# Patient Record
Sex: MALE | Race: WHITE | Employment: OTHER | URBAN - METROPOLITAN AREA
[De-identification: names, ages, dates, MRNs, and addresses within clinical notes are randomized per-mention and may not be internally consistent; named-entity substitution may affect disease eponyms.]

---

## 2023-05-05 ENCOUNTER — OFFICE VISIT (OUTPATIENT)
Dept: WOUND CARE | Facility: HOSPITAL | Age: 33
End: 2023-05-05
Attending: FAMILY MEDICINE
Payer: MEDICAID

## 2023-05-05 VITALS
DIASTOLIC BLOOD PRESSURE: 87 MMHG | SYSTOLIC BLOOD PRESSURE: 161 MMHG | HEART RATE: 92 BPM | TEMPERATURE: 98 F | OXYGEN SATURATION: 97 % | RESPIRATION RATE: 20 BRPM

## 2023-05-05 DIAGNOSIS — S81.801A OPEN WOUND OF RIGHT LOWER LEG, INITIAL ENCOUNTER: Primary | ICD-10-CM

## 2023-05-05 PROCEDURE — 99214 OFFICE O/P EST MOD 30 MIN: CPT

## 2023-05-05 NOTE — PATIENT INSTRUCTIONS
PATIENT INSTRUCTIONS      FOR Stefani Riedel , DOB 3/7/1990    DATE OF SERVICE: 2023      Apply Medihoney gel to the wound base to both of the wounds on the leg and ankle on the right side. Cover with gauze or border gauze or large Band-Aid. Change on a daily basis. You may shower and clean the wounds with soap and water. Use saline wound cleanser after every shower to clean the wounds. Always keep dressing on the wounds except when showering and changing the dressing.           Follow up with Dr. Mikhail Marshall 2 WEEKS eyeglasses

## 2023-05-05 NOTE — PROGRESS NOTES
Weekly Wound Education Note    Teaching Provided To: Patient  Training Topics: Dressing  Training Method: Demonstration;Explain/Verbal  Training Response: Patient responds and understands        Notes: initiated honey gel and gauze roll with spandagrip

## 2023-05-19 ENCOUNTER — OFFICE VISIT (OUTPATIENT)
Dept: WOUND CARE | Facility: HOSPITAL | Age: 33
End: 2023-05-19
Attending: FAMILY MEDICINE
Payer: MEDICAID

## 2023-05-19 VITALS
RESPIRATION RATE: 18 BRPM | SYSTOLIC BLOOD PRESSURE: 139 MMHG | HEART RATE: 88 BPM | TEMPERATURE: 98 F | DIASTOLIC BLOOD PRESSURE: 83 MMHG | OXYGEN SATURATION: 96 %

## 2023-05-19 DIAGNOSIS — S81.801D OPEN WOUND OF RIGHT LOWER LEG, SUBSEQUENT ENCOUNTER: Primary | ICD-10-CM

## 2023-05-19 PROCEDURE — 99215 OFFICE O/P EST HI 40 MIN: CPT | Performed by: FAMILY MEDICINE

## 2023-05-19 NOTE — PATIENT INSTRUCTIONS
PATIENT INSTRUCTIONS      FOR Lars JuankaitLINN thomas 3/7/1990    DATE OF SERVICE: 2023      Apply Medihoney gel to the wound base to both of the wounds on the leg and ankle on the right side. Cover with gauze or border gauze or large Band-Aid. Change on a daily basis. You may shower and clean the wounds with soap and water. Use saline wound cleanser after every shower to clean the wounds. Always keep dressing on the wounds except when showering and changing the dressing.           Follow up with Dr. Sterling Few 2 WEEKS

## 2023-06-02 ENCOUNTER — OFFICE VISIT (OUTPATIENT)
Dept: WOUND CARE | Facility: HOSPITAL | Age: 33
End: 2023-06-02
Attending: FAMILY MEDICINE
Payer: MEDICAID

## 2023-06-02 VITALS
HEART RATE: 89 BPM | SYSTOLIC BLOOD PRESSURE: 154 MMHG | TEMPERATURE: 98 F | DIASTOLIC BLOOD PRESSURE: 85 MMHG | OXYGEN SATURATION: 95 %

## 2023-06-02 DIAGNOSIS — S81.801D OPEN WOUND OF RIGHT LOWER LEG, SUBSEQUENT ENCOUNTER: Primary | ICD-10-CM

## 2023-06-02 PROCEDURE — 99215 OFFICE O/P EST HI 40 MIN: CPT | Performed by: FAMILY MEDICINE

## 2023-06-02 NOTE — PROGRESS NOTES
Weekly Wound Education Note    Teaching Provided To: Patient  Training Topics: Dressing;Cleasing and general instructions  Training Method: Demonstration;Explain/Verbal  Training Response: Patient responds and understands        Notes: cont.  honey gel and gauze roll with spandagrip

## 2023-06-02 NOTE — PATIENT INSTRUCTIONS
PATIENT INSTRUCTIONS      FOR LINN Gonzalez 3/7/1990    DATE OF SERVICE: 2023      Apply Medihoney gel to the wound base to both of the wounds on the leg and ankle on the right side. Cover with gauze or border gauze or large Band-Aid. Change twice a day. You may shower and clean the wounds with soap and water. Use saline wound cleanser after every shower to clean the wounds. Always keep dressing on the wounds except when showering and changing the dressing.           Follow up with Dr. Sundeep Palmer 2 WEEKS

## 2023-06-23 ENCOUNTER — APPOINTMENT (OUTPATIENT)
Dept: WOUND CARE | Facility: HOSPITAL | Age: 33
End: 2023-06-23
Attending: FAMILY MEDICINE
Payer: MEDICAID

## 2023-06-23 VITALS
HEART RATE: 93 BPM | RESPIRATION RATE: 18 BRPM | OXYGEN SATURATION: 97 % | TEMPERATURE: 97 F | DIASTOLIC BLOOD PRESSURE: 79 MMHG | SYSTOLIC BLOOD PRESSURE: 156 MMHG

## 2023-06-23 DIAGNOSIS — S81.801D OPEN WOUND OF RIGHT LOWER LEG, SUBSEQUENT ENCOUNTER: Primary | ICD-10-CM

## 2023-06-23 PROCEDURE — 99214 OFFICE O/P EST MOD 30 MIN: CPT | Performed by: FAMILY MEDICINE

## 2023-06-23 NOTE — PATIENT INSTRUCTIONS
PATIENT INSTRUCTIONS      FOR LINN Casiano 3/7/1990    DATE OF SERVICE: 2023      Apply Medihoney gel to the wound base to both of the wounds on the leg and ankle on the right side. Cover with gauze or border gauze or large Band-Aid. Change twice a day. You may shower and clean the wounds with soap and water. Use saline wound cleanser after every shower to clean the wounds. Always keep dressing on the wounds except when showering and changing the dressing.           Follow up with Dr. Payam Andrew 2 WEEKS

## 2023-07-07 ENCOUNTER — OFFICE VISIT (OUTPATIENT)
Dept: WOUND CARE | Facility: HOSPITAL | Age: 33
End: 2023-07-07
Attending: FAMILY MEDICINE
Payer: MEDICAID

## 2023-07-07 VITALS
SYSTOLIC BLOOD PRESSURE: 168 MMHG | DIASTOLIC BLOOD PRESSURE: 86 MMHG | OXYGEN SATURATION: 91 % | TEMPERATURE: 98 F | HEART RATE: 92 BPM | RESPIRATION RATE: 20 BRPM

## 2023-07-07 DIAGNOSIS — S81.801D OPEN WOUND OF RIGHT LOWER LEG, SUBSEQUENT ENCOUNTER: Primary | ICD-10-CM

## 2023-07-07 PROCEDURE — 99214 OFFICE O/P EST MOD 30 MIN: CPT | Performed by: FAMILY MEDICINE

## 2023-07-07 NOTE — PATIENT INSTRUCTIONS
PATIENT INSTRUCTIONS      FOR Walker LINN Jean Baptiste 3/7/1990    DATE OF SERVICE: 2023      Apply Medihoney gel to the wound base to both of the wounds on the leg and ankle on the right side. Cover with gauze or border gauze or large Band-Aid. Change twice a day. You may shower and clean the wounds with soap and water. Use saline wound cleanser after every shower to clean the wounds. Always keep dressing on the wounds except when showering and changing the dressing.           Follow up with Dr. Corrinne Lints 2 WEEKS

## 2023-07-07 NOTE — PROGRESS NOTES
Weekly Wound Education Note    Teaching Provided To: Patient  Training Topics: Cleasing and general instructions; Compression;Dressing;Skin care;Nutrition; Negative presssure therapy  Training Method: Demonstration  Training Response: Patient responds and understands        Notes: right leg- honey gel; gauze; tape

## 2023-07-21 ENCOUNTER — APPOINTMENT (OUTPATIENT)
Dept: WOUND CARE | Facility: HOSPITAL | Age: 33
End: 2023-07-21
Attending: NURSE PRACTITIONER
Payer: MEDICAID

## 2023-07-28 ENCOUNTER — APPOINTMENT (OUTPATIENT)
Dept: WOUND CARE | Facility: HOSPITAL | Age: 33
End: 2023-07-28
Attending: FAMILY MEDICINE
Payer: MEDICAID